# Patient Record
Sex: FEMALE
[De-identification: names, ages, dates, MRNs, and addresses within clinical notes are randomized per-mention and may not be internally consistent; named-entity substitution may affect disease eponyms.]

---

## 2018-07-03 ENCOUNTER — HOSPITAL ENCOUNTER (EMERGENCY)
Dept: HOSPITAL 31 - C.ER | Age: 58
Discharge: HOME | End: 2018-07-03
Payer: SELF-PAY

## 2018-07-03 VITALS — BODY MASS INDEX: 27.6 KG/M2

## 2018-07-03 VITALS — DIASTOLIC BLOOD PRESSURE: 88 MMHG | SYSTOLIC BLOOD PRESSURE: 162 MMHG | HEART RATE: 98 BPM | RESPIRATION RATE: 16 BRPM

## 2018-07-03 VITALS — TEMPERATURE: 98.1 F | OXYGEN SATURATION: 100 %

## 2018-07-03 DIAGNOSIS — R03.0: Primary | ICD-10-CM

## 2018-07-03 LAB
ALBUMIN SERPL-MCNC: 4.8 [, G/DL] (ref 3.5–5)
ALBUMIN/GLOB SERPL: 1.2 [,] (ref 1–2.1)
ALT SERPL-CCNC: 23 [, U/L] (ref 9–52)
AST SERPL-CCNC: 26 [, U/L] (ref 14–36)
BACTERIA #/AREA URNS HPF: (no result) [,]
BASOPHILS # BLD AUTO: 0.1 [, K/UL] (ref 0–0.2)
BASOPHILS NFR BLD: 0.8 [, %] (ref 0–2)
BILIRUB UR-MCNC: NEGATIVE [,]
BUN SERPL-MCNC: 10 [, MG/DL] (ref 7–17)
CALCIUM SERPL-MCNC: 9.2 [, MG/DL] (ref 8.6–10.4)
CK MB SERPL-MCNC: 0.56 [, NG/ML] (ref 0–3.38)
EOSINOPHIL # BLD AUTO: 0 [, K/UL] (ref 0–0.7)
EOSINOPHIL NFR BLD: 0.2 [, %] (ref 0–4)
ERYTHROCYTE [DISTWIDTH] IN BLOOD BY AUTOMATED COUNT: 13 [, %] (ref 11.5–14.5)
GFR NON-AFRICAN AMERICAN: > 60 [,]
GLUCOSE UR STRIP-MCNC: NORMAL [, MG/DL]
HGB BLD-MCNC: 13.3 [, G/DL] (ref 11–16)
LEUKOCYTE ESTERASE UR-ACNC: (no result) [, LEU/UL]
LYMPHOCYTES # BLD AUTO: 1.5 [, K/UL] (ref 1–4.3)
LYMPHOCYTES NFR BLD AUTO: 16.1 [, %] (ref 20–40)
MCH RBC QN AUTO: 28.3 [, PG] (ref 27–31)
MCHC RBC AUTO-ENTMCNC: 34.2 [, G/DL] (ref 33–37)
MCV RBC AUTO: 82.6 [, FL] (ref 81–99)
MONOCYTES # BLD: 0.8 [, K/UL] (ref 0–0.8)
MONOCYTES NFR BLD: 8.2 [, %] (ref 0–10)
NEUTROPHILS # BLD: 7.1 [, K/UL] (ref 1.8–7)
NEUTROPHILS NFR BLD AUTO: 74.7 [, %] (ref 50–75)
NRBC BLD AUTO-RTO: 0.1 [, %] (ref 0–2)
PH UR STRIP: 7 [,] (ref 5–8)
PLATELET # BLD: 263 [, K/UL] (ref 130–400)
PMV BLD AUTO: 8.5 [, FL] (ref 7.2–11.7)
PROT UR STRIP-MCNC: NEGATIVE [, MG/DL]
RBC # BLD AUTO: 4.69 [, MIL/UL] (ref 3.8–5.2)
RBC # UR STRIP: NEGATIVE [,]
SP GR UR STRIP: 1 [,] (ref 1–1.03)
SQUAMOUS EPITHIAL: < 1 [, /HPF] (ref 0–5)
UROBILINOGEN UR-MCNC: NORMAL [, MG/DL] (ref 0.2–1)
WBC # BLD AUTO: 9.5 [, K/UL] (ref 4.8–10.8)

## 2018-07-03 PROCEDURE — 81001 URINALYSIS AUTO W/SCOPE: CPT

## 2018-07-03 PROCEDURE — 93005 ELECTROCARDIOGRAM TRACING: CPT

## 2018-07-03 PROCEDURE — 84443 ASSAY THYROID STIM HORMONE: CPT

## 2018-07-03 PROCEDURE — 82550 ASSAY OF CK (CPK): CPT

## 2018-07-03 PROCEDURE — 82553 CREATINE MB FRACTION: CPT

## 2018-07-03 PROCEDURE — 85025 COMPLETE CBC W/AUTO DIFF WBC: CPT

## 2018-07-03 PROCEDURE — 80053 COMPREHEN METABOLIC PANEL: CPT

## 2018-07-03 PROCEDURE — 99283 EMERGENCY DEPT VISIT LOW MDM: CPT

## 2018-07-03 PROCEDURE — 84484 ASSAY OF TROPONIN QUANT: CPT

## 2018-07-03 NOTE — C.PDOC
History Of Present Illness


58-year-old female presents to the emergency department, sent from medical 

clinic after a routine visits for evaluation of elevated BP and heart rate.  

Patient is asymptomatic at this time.  She denies chest pain, shortness of 

breath, palpitations, fever, cough, abdominal pain, or any other associated 

symptoms. 


Time Seen by Provider: 07/03/18 15:18


Chief Complaint (Nursing): High Blood Pressure


History Per: Patient


History/Exam Limitations: no limitations


Quality Of Symptoms: Asymptomatic


Severity: None





Past Medical History


Reviewed: Historical Data, Nursing Documentation, Vital Signs


Vital Signs: 


 Last Vital Signs











Temp  98.1 F   07/03/18 14:21


 


Pulse  98 H  07/03/18 16:54


 


Resp  16   07/03/18 16:54


 


BP  162/88 H  07/03/18 16:54


 


Pulse Ox  100   07/03/18 22:42














- Medical History


PMH: HTN (stopped mos 2 months ago)


Family History: States: No Known Family Hx





- Social History


Hx Alcohol Use: No


Hx Substance Use: No





- Immunization History


Hx Tetanus Toxoid Vaccination: No


Hx Influenza Vaccination: No


Hx Pneumococcal Vaccination: No





Review Of Systems


Constitutional: Negative for: Fever, Chills


Cardiovascular: Negative for: Chest Pain, Palpitations


Respiratory: Negative for: Cough, Shortness of Breath


Gastrointestinal: Negative for: Nausea, Vomiting, Abdominal Pain


Musculoskeletal: Negative for: Back Pain


Skin: Negative for: Rash


Neurological: Negative for: Weakness, Numbness, Headache, Dizziness





Physical Exam





- Physical Exam


Appears: Well, Non-toxic, No Acute Distress


Skin: Normal Color, Warm, Dry, No Rash


Head: Atraumatic, Normacephalic


Eye(s): bilateral: Normal Inspection, PERRL, EOMI


Oral Mucosa: Moist


Neck: Normal, Normal ROM, Other (no thyromegaly)


Chest: Symmetrical


Cardiovascular: Rhythm Regular (Tachycardic)


Respiratory: Normal Breath Sounds, No Rales, No Rhonchi, No Wheezing


Gastrointestinal/Abdominal: Normal Exam, Bowel Sounds, Soft, No Tenderness


Back: Normal Inspection


Extremity: Normal ROM, No Pedal Edema, No Calf Tenderness


Pulses: Left Dorsalis Pedis: Normal, Right Dorsalis Pedis: Normal


Neurological/Psych: Oriented x3





ED Course And Treatment





- Laboratory Results


Result Diagrams: 


 07/03/18 15:58





 07/03/18 15:58


ECG: Interpreted By Me, Viewed By Me (sinus tachycardia 114 bpm, normal axis, 

no acute ST/T wave changes)


ECG Interpretation: Abnormal


O2 Sat by Pulse Oximetry: 100 (RA)


Pulse Ox Interpretation: Normal


Progress Note: Blood work,  EKG, UA, UDS ordered and reviewed.  Patient given 

IV NS bolus.


Reevaluation Time: 17:30


Reassessment Condition: Improved (Patient's  at bedside, states patient 

sometimes gets very nervous when she sees doctors, and gets elevated heart rate 

and BP.  Symptoms likely due to white coat syndrome vs dehydration.  Patient is 

well appearing, vitals have improved, and she is comfortable being discharged 

home.  She was instructed to follow up in medical clinic in 1-2 days, and 

understands she should return to ED if she has any concerning symptoms.)





Disposition


Counseled Patient/Family Regarding: Studies Performed, Diagnosis, Need For 

Followup





- Disposition


Referrals: 


[facility] [Outside]


Disposition: HOME/ ROUTINE


Disposition Time: 17:30


Condition: STABLE


Additional Instructions: 


FOLLOW UP IN THE MEDICAL CLINIC IN 1-2 DAYS





DRINK PLENTY OF FLUIDS 





RETURN TO ER IF SYMPTOMS WORSEN 


Forms:  [facility] (English), General Discharge Instructions


Print Language: ENGLISH





- POA


Present On Arrival: None





- Clinical Impression


Clinical Impression: 


 Evaluation by medical service required, White coat syndrome without diagnosis 

of hypertension








- Scribe Statement


The provider has reviewed the documentation as recorded by the Scribe (Cee Rizo)








All medical record entries made by the Scribe were at my direction and 

personally dictated by me. I have reviewed the chart and agree that the record 

accurately reflects my personal performance of the history, physical exam, 

medical decision making, and the department course for this patient. I have 

also personally directed, reviewed, and agree with the discharge instructions 

and disposition.

## 2018-07-05 NOTE — CARD
--------------- APPROVED REPORT --------------





EKG Measurement

Heart [id number]

MN [id number]

[id number]

[id number]

[id number]



<Conclusion>

Sinus tachycardia

Possible Left atrial enlargement

Nonspecific ST abnormality

Abnormal ECG